# Patient Record
Sex: FEMALE | Race: WHITE | NOT HISPANIC OR LATINO | URBAN - METROPOLITAN AREA
[De-identification: names, ages, dates, MRNs, and addresses within clinical notes are randomized per-mention and may not be internally consistent; named-entity substitution may affect disease eponyms.]

---

## 2017-09-09 ENCOUNTER — APPOINTMENT (OUTPATIENT)
Dept: GENERAL RADIOLOGY | Facility: HOSPITAL | Age: 67
End: 2017-09-09

## 2017-09-09 ENCOUNTER — HOSPITAL ENCOUNTER (EMERGENCY)
Facility: HOSPITAL | Age: 67
Discharge: HOME | End: 2017-09-10
Attending: EMERGENCY MEDICINE | Admitting: EMERGENCY MEDICINE

## 2017-09-09 ENCOUNTER — APPOINTMENT (OUTPATIENT)
Dept: CT IMAGING | Facility: HOSPITAL | Age: 67
End: 2017-09-09

## 2017-09-09 DIAGNOSIS — R10.33 PERIUMBILICAL ABDOMINAL PAIN: Primary | ICD-10-CM

## 2017-09-09 DIAGNOSIS — R10.31 RLQ ABDOMINAL PAIN: ICD-10-CM

## 2017-09-09 DIAGNOSIS — K59.00 CONSTIPATION, UNSPECIFIED CONSTIPATION TYPE: ICD-10-CM

## 2017-09-09 LAB
ALBUMIN SERPL-MCNC: 4.7 G/DL (ref 3.2–4.8)
ALBUMIN/GLOB SERPL: 1.3 G/DL (ref 1.5–2.5)
ALP SERPL-CCNC: 52 U/L (ref 25–100)
ALT SERPL W P-5'-P-CCNC: 17 U/L (ref 7–40)
ANION GAP SERPL CALCULATED.3IONS-SCNC: 4 MMOL/L (ref 3–11)
AST SERPL-CCNC: 24 U/L (ref 0–33)
BASOPHILS # BLD AUTO: 0.03 10*3/MM3 (ref 0–0.2)
BASOPHILS NFR BLD AUTO: 0.6 % (ref 0–1)
BILIRUB SERPL-MCNC: 0.5 MG/DL (ref 0.3–1.2)
BILIRUB UR QL STRIP: NEGATIVE
BUN BLD-MCNC: 12 MG/DL (ref 9–23)
BUN/CREAT SERPL: 17.1 (ref 7–25)
CALCIUM SPEC-SCNC: 10.9 MG/DL (ref 8.7–10.4)
CHLORIDE SERPL-SCNC: 102 MMOL/L (ref 99–109)
CLARITY UR: CLEAR
CO2 SERPL-SCNC: 33 MMOL/L (ref 20–31)
COLOR UR: YELLOW
CREAT BLD-MCNC: 0.7 MG/DL (ref 0.6–1.3)
DEPRECATED RDW RBC AUTO: 40.9 FL (ref 37–54)
EOSINOPHIL # BLD AUTO: 0.08 10*3/MM3 (ref 0–0.3)
EOSINOPHIL NFR BLD AUTO: 1.6 % (ref 0–3)
ERYTHROCYTE [DISTWIDTH] IN BLOOD BY AUTOMATED COUNT: 12.7 % (ref 11.3–14.5)
GFR SERPL CREATININE-BSD FRML MDRD: 84 ML/MIN/1.73
GLOBULIN UR ELPH-MCNC: 3.7 GM/DL
GLUCOSE BLD-MCNC: 118 MG/DL (ref 70–100)
GLUCOSE UR STRIP-MCNC: NEGATIVE MG/DL
HCT VFR BLD AUTO: 42 % (ref 34.5–44)
HGB BLD-MCNC: 14.3 G/DL (ref 11.5–15.5)
HGB UR QL STRIP.AUTO: NEGATIVE
HOLD SPECIMEN: NORMAL
HOLD SPECIMEN: NORMAL
IMM GRANULOCYTES # BLD: 0 10*3/MM3 (ref 0–0.03)
IMM GRANULOCYTES NFR BLD: 0 % (ref 0–0.6)
KETONES UR QL STRIP: NEGATIVE
LEUKOCYTE ESTERASE UR QL STRIP.AUTO: NEGATIVE
LIPASE SERPL-CCNC: 38 U/L (ref 6–51)
LYMPHOCYTES # BLD AUTO: 1.47 10*3/MM3 (ref 0.6–4.8)
LYMPHOCYTES NFR BLD AUTO: 29.3 % (ref 24–44)
MCH RBC QN AUTO: 29.7 PG (ref 27–31)
MCHC RBC AUTO-ENTMCNC: 34 G/DL (ref 32–36)
MCV RBC AUTO: 87.1 FL (ref 80–99)
MONOCYTES # BLD AUTO: 0.3 10*3/MM3 (ref 0–1)
MONOCYTES NFR BLD AUTO: 6 % (ref 0–12)
NEUTROPHILS # BLD AUTO: 3.14 10*3/MM3 (ref 1.5–8.3)
NEUTROPHILS NFR BLD AUTO: 62.5 % (ref 41–71)
NITRITE UR QL STRIP: NEGATIVE
PH UR STRIP.AUTO: 6 [PH] (ref 5–8)
PLATELET # BLD AUTO: 148 10*3/MM3 (ref 150–450)
PMV BLD AUTO: 8.1 FL (ref 6–12)
POTASSIUM BLD-SCNC: 3.8 MMOL/L (ref 3.5–5.5)
PROT SERPL-MCNC: 8.4 G/DL (ref 5.7–8.2)
PROT UR QL STRIP: NEGATIVE
RBC # BLD AUTO: 4.82 10*6/MM3 (ref 3.89–5.14)
SODIUM BLD-SCNC: 139 MMOL/L (ref 132–146)
SP GR UR STRIP: 1.01 (ref 1–1.03)
TROPONIN I SERPL-MCNC: 0.01 NG/ML (ref 0–0.07)
UROBILINOGEN UR QL STRIP: NORMAL
WBC NRBC COR # BLD: 5.02 10*3/MM3 (ref 3.5–10.8)
WHOLE BLOOD HOLD SPECIMEN: NORMAL
WHOLE BLOOD HOLD SPECIMEN: NORMAL

## 2017-09-09 PROCEDURE — 74177 CT ABD & PELVIS W/CONTRAST: CPT

## 2017-09-09 PROCEDURE — 0 IOPAMIDOL 61 % SOLUTION: Performed by: EMERGENCY MEDICINE

## 2017-09-09 PROCEDURE — 93005 ELECTROCARDIOGRAM TRACING: CPT

## 2017-09-09 PROCEDURE — 25010000002 PROMETHAZINE PER 50 MG: Performed by: PHYSICIAN ASSISTANT

## 2017-09-09 PROCEDURE — 80053 COMPREHEN METABOLIC PANEL: CPT | Performed by: EMERGENCY MEDICINE

## 2017-09-09 PROCEDURE — 96374 THER/PROPH/DIAG INJ IV PUSH: CPT

## 2017-09-09 PROCEDURE — 96361 HYDRATE IV INFUSION ADD-ON: CPT

## 2017-09-09 PROCEDURE — 81003 URINALYSIS AUTO W/O SCOPE: CPT | Performed by: EMERGENCY MEDICINE

## 2017-09-09 PROCEDURE — 71010 HC CHEST PA OR AP: CPT

## 2017-09-09 PROCEDURE — 83690 ASSAY OF LIPASE: CPT | Performed by: EMERGENCY MEDICINE

## 2017-09-09 PROCEDURE — 85025 COMPLETE CBC W/AUTO DIFF WBC: CPT | Performed by: EMERGENCY MEDICINE

## 2017-09-09 PROCEDURE — 84484 ASSAY OF TROPONIN QUANT: CPT

## 2017-09-09 PROCEDURE — 99284 EMERGENCY DEPT VISIT MOD MDM: CPT

## 2017-09-09 PROCEDURE — 0 DIATRIZOATE MEGLUMINE & SODIUM PER 1 ML: Performed by: EMERGENCY MEDICINE

## 2017-09-09 RX ORDER — MAGNESIUM CARB/ALUMINUM HYDROX 105-160MG
150 TABLET,CHEWABLE ORAL ONCE
Status: COMPLETED | OUTPATIENT
Start: 2017-09-09 | End: 2017-09-10

## 2017-09-09 RX ORDER — DOCUSATE SODIUM 100 MG/1
100 CAPSULE, LIQUID FILLED ORAL 2 TIMES DAILY
Qty: 30 CAPSULE | Refills: 0 | Status: SHIPPED | OUTPATIENT
Start: 2017-09-09

## 2017-09-09 RX ORDER — SODIUM CHLORIDE 0.9 % (FLUSH) 0.9 %
10 SYRINGE (ML) INJECTION AS NEEDED
Status: DISCONTINUED | OUTPATIENT
Start: 2017-09-09 | End: 2017-09-10 | Stop reason: HOSPADM

## 2017-09-09 RX ORDER — PROMETHAZINE HYDROCHLORIDE 25 MG/ML
12.5 INJECTION, SOLUTION INTRAMUSCULAR; INTRAVENOUS ONCE
Status: COMPLETED | OUTPATIENT
Start: 2017-09-09 | End: 2017-09-09

## 2017-09-09 RX ORDER — POLYETHYLENE GLYCOL 3350 17 G/17G
17 POWDER, FOR SOLUTION ORAL DAILY PRN
Qty: 30 EACH | Refills: 0 | Status: SHIPPED | OUTPATIENT
Start: 2017-09-09

## 2017-09-09 RX ORDER — CITALOPRAM 10 MG/1
10 TABLET ORAL DAILY
COMMUNITY

## 2017-09-09 RX ADMIN — SODIUM CHLORIDE 1000 ML: 9 INJECTION, SOLUTION INTRAVENOUS at 21:07

## 2017-09-09 RX ADMIN — DIATRIZOATE MEGLUMINE AND DIATRIZOATE SODIUM 15 ML: 660; 100 LIQUID ORAL; RECTAL at 21:11

## 2017-09-09 RX ADMIN — IOPAMIDOL 80 ML: 612 INJECTION, SOLUTION INTRAVENOUS at 22:26

## 2017-09-09 RX ADMIN — PROMETHAZINE HYDROCHLORIDE 12.5 MG: 25 INJECTION INTRAMUSCULAR; INTRAVENOUS at 21:07

## 2017-09-10 VITALS
TEMPERATURE: 98.1 F | SYSTOLIC BLOOD PRESSURE: 121 MMHG | BODY MASS INDEX: 20.66 KG/M2 | HEART RATE: 65 BPM | DIASTOLIC BLOOD PRESSURE: 73 MMHG | RESPIRATION RATE: 16 BRPM | WEIGHT: 124 LBS | HEIGHT: 65 IN | OXYGEN SATURATION: 97 %

## 2017-09-10 RX ADMIN — CITROMA MAGNESIUM CITRATE 150 ML: 1.75 LIQUID ORAL at 00:46

## 2017-09-10 NOTE — DISCHARGE INSTRUCTIONS
Patient abdominal exam revealed periumbilical and right lower quadrant abdominal pain.  We performed CT of the abdomen and pelvis with or oral and IV contrast to evaluate for appendicitis.  The appendix was visualized on CT scan and was within normal limits.  There was no sign of inflammatory change.  Patient did have constipation.  Rx for Colace and MiraLAX.  Patient is to have repeat abdominal exam in 12 hours if symptoms persist or return sooner if worsening symptoms of abdominal pain.  She also needs to follow up closely with Dr. Billings, her PCP on Monday for recheck.

## 2017-09-10 NOTE — ED PROVIDER NOTES
Subjective   HPI Comments: This is a very pleasant 67-year-old  female that presents to the ER with onset of periumbilical abdominal pain that started this afternoon at around 1:30 PM.  Patient ate a normal breakfast and lunch.  She started having periumbilical pain that was cramping and constant.  She tried to take some Tums and Pepto-Bismol without any relief.  The pain started to worsen and migrate down to her right lower quadrant.  Patient became concerned about appendicitis.  She has a strong family history of appendicitis in both her mother, sister, and multiple other relatives.  She reports chills but denies any particular fever.  She denies any urinary changes including dysuria, frequency, or urgency.  She had a normal bowel movement this morning.  She says movement worsens her abdominal pain.  She denies any previous abdominal surgery.    Patient is a 67 y.o. female presenting with abdominal pain.   History provided by:  Patient  Abdominal Pain   Pain location:  Periumbilical and RLQ  Pain quality: aching and throbbing    Pain radiation: pain radiates from periumbilical to RLQ now.  Pain severity:  Moderate  Onset quality:  Sudden  Duration:  6 hours  Timing:  Constant  Progression:  Worsening  Chronicity:  New  Context: not diet changes, not eating, not previous surgeries and not recent illness    Relieved by:  Nothing  Worsened by:  Movement (sitting, walking, riding in the car worsened pain.)  Ineffective treatments: Tums, Pepto Bismol.  Associated symptoms: anorexia and chills    Associated symptoms: no chest pain, no constipation (Normal BM earlier today.), no cough, no diarrhea, no dysuria, no fever, no hematochezia, no hematuria, no nausea, no shortness of breath and no vomiting    Risk factors: no NSAID use        Review of Systems   Constitutional: Positive for appetite change (anorexia) and chills. Negative for fever.   HENT: Negative.    Respiratory: Negative for cough, chest tightness,  shortness of breath and wheezing.    Cardiovascular: Negative for chest pain and palpitations.   Gastrointestinal: Positive for abdominal pain and anorexia. Negative for constipation (Normal BM earlier today.), diarrhea, hematochezia, nausea and vomiting.   Genitourinary: Negative for decreased urine volume, difficulty urinating, dysuria, hematuria and urgency.   Musculoskeletal: Negative.    Neurological: Negative for dizziness, syncope, weakness and light-headedness.   Psychiatric/Behavioral: Negative.        Past Medical History:   Diagnosis Date   • Depression        Allergies   Allergen Reactions   • Sulfa Antibiotics Dizziness       Past Surgical History:   Procedure Laterality Date   • CATARACT EXTRACTION         History reviewed. No pertinent family history.    Social History     Social History   • Marital status:      Spouse name: N/A   • Number of children: N/A   • Years of education: N/A     Social History Main Topics   • Smoking status: Never Smoker   • Smokeless tobacco: None   • Alcohol use Yes      Comment: occasional   • Drug use: No   • Sexual activity: Not Asked     Other Topics Concern   • None     Social History Narrative   • None           Objective   Physical Exam   Constitutional: She is oriented to person, place, and time. She appears well-developed and well-nourished. No distress.   HENT:   Head: Normocephalic and atraumatic.   Mouth/Throat: Oropharynx is clear and moist.   Eyes: Conjunctivae and EOM are normal. Pupils are equal, round, and reactive to light. No scleral icterus.   Neck: Normal range of motion. Neck supple.   Cardiovascular: Normal rate, regular rhythm and normal heart sounds.    Pulmonary/Chest: Effort normal and breath sounds normal. No respiratory distress.   Abdominal: Soft. Bowel sounds are normal. She exhibits no distension. There is no hepatosplenomegaly. There is tenderness in the right lower quadrant and periumbilical area. There is guarding and tenderness at  McBurney's point. There is no rigidity, no rebound and no CVA tenderness. No hernia.       Negative flank or CVA TTP.  Negative Rovsings sign.  Negative heel tap.  Positive Obturator sign.   Musculoskeletal: Normal range of motion. She exhibits no edema.   Lymphadenopathy:     She has no cervical adenopathy.   Neurological: She is alert and oriented to person, place, and time.   Skin: Skin is warm and dry. She is not diaphoretic.   Psychiatric: She has a normal mood and affect. Her behavior is normal.   Nursing note and vitals reviewed.      Procedures         ED Course  ED Course   Comment By Time   CT of the abdomen and pelvis with oral and IV contrast revealed no acute evidence of appendicitis.  Appendix was visualized and there were no surrounding inflammatory changes.  There was evidence of constipation.  We'll discuss these findings with patient and prepare her for discharge. Jolanta Elliott PA-C 09/09 1249   Discussed results with patient.  She is feeling much better after IVF.  Re-examined abdomen and mild TTP RLQ.  No guarding.  Pt says she feels like she is having normal BMs.  I prescribed stool softeners and Miralax.  I also will give her a bottle of Mag Citrate to go.  She can take 1/2 bottle once home.  If no improvements, she can take the other 1/2 bottle in 12 hours.  She verbalized understanding.  She is to return for abdominal re-check in 12 hours if pain persists. Jolanta Elliott PA-C 09/09 3702          Recent Results (from the past 24 hour(s))   Comprehensive Metabolic Panel    Collection Time: 09/09/17  7:58 PM   Result Value Ref Range    Glucose 118 (H) 70 - 100 mg/dL    BUN 12 9 - 23 mg/dL    Creatinine 0.70 0.60 - 1.30 mg/dL    Sodium 139 132 - 146 mmol/L    Potassium 3.8 3.5 - 5.5 mmol/L    Chloride 102 99 - 109 mmol/L    CO2 33.0 (H) 20.0 - 31.0 mmol/L    Calcium 10.9 (H) 8.7 - 10.4 mg/dL    Total Protein 8.4 (H) 5.7 - 8.2 g/dL    Albumin 4.70 3.20 - 4.80 g/dL    ALT (SGPT) 17 7 - 40 U/L     AST (SGOT) 24 0 - 33 U/L    Alkaline Phosphatase 52 25 - 100 U/L    Total Bilirubin 0.5 0.3 - 1.2 mg/dL    eGFR Non African Amer 84 >60 mL/min/1.73    Globulin 3.7 gm/dL    A/G Ratio 1.3 (L) 1.5 - 2.5 g/dL    BUN/Creatinine Ratio 17.1 7.0 - 25.0    Anion Gap 4.0 3.0 - 11.0 mmol/L   Lipase    Collection Time: 09/09/17  7:58 PM   Result Value Ref Range    Lipase 38 6 - 51 U/L   Urinalysis With / Culture If Indicated    Collection Time: 09/09/17  7:58 PM   Result Value Ref Range    Color, UA Yellow Yellow, Straw    Appearance, UA Clear Clear    pH, UA 6.0 5.0 - 8.0    Specific Gravity, UA 1.014 1.001 - 1.030    Glucose, UA Negative Negative    Ketones, UA Negative Negative    Bilirubin, UA Negative Negative    Blood, UA Negative Negative    Protein, UA Negative Negative    Leuk Esterase, UA Negative Negative    Nitrite, UA Negative Negative    Urobilinogen, UA 0.2 E.U./dL 0.2 - 1.0 E.U./dL   Light Blue Top    Collection Time: 09/09/17  7:58 PM   Result Value Ref Range    Extra Tube hold for add-on    Green Top (Gel)    Collection Time: 09/09/17  7:58 PM   Result Value Ref Range    Extra Tube Hold for add-ons.    Lavender Top    Collection Time: 09/09/17  7:58 PM   Result Value Ref Range    Extra Tube hold for add-on    Gold Top - SST    Collection Time: 09/09/17  7:58 PM   Result Value Ref Range    Extra Tube Hold for add-ons.    CBC Auto Differential    Collection Time: 09/09/17  7:58 PM   Result Value Ref Range    WBC 5.02 3.50 - 10.80 10*3/mm3    RBC 4.82 3.89 - 5.14 10*6/mm3    Hemoglobin 14.3 11.5 - 15.5 g/dL    Hematocrit 42.0 34.5 - 44.0 %    MCV 87.1 80.0 - 99.0 fL    MCH 29.7 27.0 - 31.0 pg    MCHC 34.0 32.0 - 36.0 g/dL    RDW 12.7 11.3 - 14.5 %    RDW-SD 40.9 37.0 - 54.0 fl    MPV 8.1 6.0 - 12.0 fL    Platelets 148 (L) 150 - 450 10*3/mm3    Neutrophil % 62.5 41.0 - 71.0 %    Lymphocyte % 29.3 24.0 - 44.0 %    Monocyte % 6.0 0.0 - 12.0 %    Eosinophil % 1.6 0.0 - 3.0 %    Basophil % 0.6 0.0 - 1.0 %    Immature  "Grans % 0.0 0.0 - 0.6 %    Neutrophils, Absolute 3.14 1.50 - 8.30 10*3/mm3    Lymphocytes, Absolute 1.47 0.60 - 4.80 10*3/mm3    Monocytes, Absolute 0.30 0.00 - 1.00 10*3/mm3    Eosinophils, Absolute 0.08 0.00 - 0.30 10*3/mm3    Basophils, Absolute 0.03 0.00 - 0.20 10*3/mm3    Immature Grans, Absolute 0.00 0.00 - 0.03 10*3/mm3   POC Troponin, Rapid    Collection Time: 09/09/17  8:01 PM   Result Value Ref Range    Troponin I 0.01 0.00 - 0.07 ng/mL     Note: In addition to lab results from this visit, the labs listed above may include labs taken at another facility or during a different encounter within the last 24 hours. Please correlate lab times with ED admission and discharge times for further clarification of the services performed during this visit.    CT Abdomen Pelvis With Contrast   Final Result   Abnormal     Numerous non-emergent findings as described without any acute abdominopelvic    abnormality.         Colon is distended with fecal matter suggestive of constipation.       NOTE: NORMAL APPENDIX without CT evidence of acute appendicitis.         THIS DOCUMENT HAS BEEN ELECTRONICALLY SIGNED BY ANTON DIAZ MD      XR Chest 1 View   Final Result   Abnormal     No active lung parenchymal lesion.          THIS DOCUMENT HAS BEEN ELECTRONICALLY SIGNED BY ANTON DIAZ MD        Vitals:    09/09/17 1918 09/09/17 2000 09/09/17 2002 09/09/17 2204   BP: (!) 184/74 173/88  143/84   BP Location: Right arm      Patient Position: Sitting      Pulse: 63  66 71   Resp: 20      Temp: 98 °F (36.7 °C)      TempSrc: Oral      SpO2: 99%  99% 96%   Weight: 124 lb (56.2 kg)      Height: 65\" (165.1 cm)        Medications   sodium chloride 0.9 % flush 10 mL (not administered)   magnesium citrate solution 150 mL (not administered)   sodium chloride 0.9 % bolus 1,000 mL (0 mL Intravenous Stopped 9/9/17 2202)   promethazine (PHENERGAN) injection 12.5 mg (12.5 mg Intravenous Given 9/9/17 2107)   diatrizoate meglumine-sodium " (GASTROGRAFIN) 66-10 % solution 15 mL (15 mL Oral Given 9/9/17 2111)   iopamidol (ISOVUE-300) 61 % injection 100 mL (80 mL Intravenous Given 9/9/17 2226)     ECG/EMG Results (last 24 hours)     Procedure Component Value Units Date/Time    ECG 12 Lead [[id number]] Collected:  09/09/17 1948     Updated:  09/1951                [facility]    Final diagnoses:   Periumbilical abdominal pain   RLQ abdominal pain   Constipation, unspecified constipation type            Jolanta Elliott PA-C  09/09/17 1488

## 2023-03-10 ENCOUNTER — LAB (OUTPATIENT)
Dept: LAB | Facility: HOSPITAL | Age: 73
End: 2023-03-10

## 2023-03-10 ENCOUNTER — CLINICAL SUPPORT (OUTPATIENT)
Dept: GENETICS | Facility: HOSPITAL | Age: 73
End: 2023-03-10

## 2023-03-10 DIAGNOSIS — Z13.79 GENETIC TESTING: Primary | ICD-10-CM

## 2023-03-10 DIAGNOSIS — Z80.41 FAMILY HISTORY OF MALIGNANT NEOPLASM OF OVARY: ICD-10-CM

## 2023-03-10 PROCEDURE — 96040: CPT | Performed by: GENETIC COUNSELOR, MS

## 2023-03-10 NOTE — PROGRESS NOTES
Tanja Nunes, a 72-year-old female, was referred for genetic counseling due to a family history of ovarian cancer. She was 14 years old at menarche and had her first child at 36. Ms. Nunes’s current cancer screening include annual clinical breast exam, annual mammogram, and colonoscopy every five years. She reports participating in ovarian cancer screening at . Ms. Nunes reports having a few polyps removed on colonoscopy. She retains her uterus and ovaries. She was interested in discussing her risk for a hereditary cancer syndrome.  Ms. Nunes was interested in pursuing a multigene panel, and therefore the CancerNext panel was ordered through [facility] which analyzes BRCA1/2 and 34 additional genes associated with an increased cancer risk. Results are expected in 2-3 weeks.     FAMILY HISTORY (see attached pedigree):   Mother:   Ovarian cancer, 84  Mat. Grandmother:  Ovarian cancer, 72    We do not have medical records confirming the diagnoses in Ms. Nunes’s family.    RISK ASSESSMENT:  Ms. Nunes’s family history of ovarian cancer led to concern regarding a hereditary cancer syndrome.  She meets NCCN guidelines criteria for BRCA1/2 testing based on her family history of ovarian cancer in two close relatives. If genetic testing is negative, Ms. Nunes’s management should be guided by family history.     GENETIC COUNSELING (30 minutes):  We reviewed the family history information in detail.  Cases of cancer follow three general patterns: sporadic, familial, and hereditary.  While most cancer is sporadic, some cases appear to occur in family clusters.  These cases are said to be familial and account for 10-20% of certain cancer cases.  Familial cases may be due to a combination of shared genes and environmental factors among family members.  In even fewer families, the cancer is said to be inherited, and the genes responsible for the cancer are known.       Family histories typical of hereditary cancer syndromes usually include multiple first- and second-degree relatives diagnosed with cancer types that define a syndrome.  These cases tend to be diagnosed at younger-than-expected ages and can be bilateral or multifocal.  The cancer in these families follows an autosomal dominant inheritance pattern, which indicates the likely presence of a mutation in a cancer susceptibility gene.  Children and siblings of an individual believed to carry this mutation have a 50% chance of inheriting that mutation, thereby inheriting the increased risk to develop cancer.  These mutations can be passed down from the maternal or the paternal lineage.    Based on Ms. Nunes’s family history, we discussed that hereditary breast and ovarian cancer accounts for approximately 10% of all cases.  A significant proportion of hereditary breast and ovarian cancer can be attributed to mutations in the BRCA1 and BRCA2 genes.  Mutations in these genes confer an increased risk for breast cancer, ovarian cancer, male breast cancer, prostate cancer, and pancreatic cancer. Women with a BRCA1 or BRCA2 mutation have up to an 87% lifetime risk of breast cancer and up to a 44% risk of ovarian cancer. These genes are not responsible for every case of hereditary breast and ovarian cancer, and we discussed multigene panels that can evaluate BRCA1/2 and a number of additional cancer related genes simultaneously. The standard approach to genetic testing is via a multigene panel.  Genes included on these panels have varying degrees of risk associated, and management and screening guidelines vary based on the specific gene.  Hereditary cancer syndromes can demonstrate incomplete penetrance and variable expression within families. There are genes that are evaluated that have been more recently described, and there may be less data regarding the risks and therefore may not have established management  guidelines at this time. Based on Ms. Nunes’s family history and her desire to get more information regarding her personal risks she opted to pursue testing through a panel evaluating several other genes known to increase the risk for cancer.    GENETIC TESTING:  The risks, benefits and limitations of genetic testing and implications for clinical management following testing were reviewed. DNA test results can influence decisions regarding screening and prevention.  Genetic testing can have significant psychological implications for both individuals and families. Also discussed was the possibility of employment and insurance discrimination based on genetic test results and the federal and states laws that are in place to prevent this ([place]).         We discussed multigene panel testing, which would involve testing BRCA1/2 and an additional 34 genes associated with an increased cancer risk. The benefits and limitations of genetic testing were discussed and Ms. Nunes decided to pursue testing of the genes via the panel. The implications of a positive or negative test result were discussed.  We also discussed the importance of testing on an affected relative.  In cases where an affected relative is not available for testing or not willing to pursue testing, it is appropriate to offer testing to an unaffected individual. We discussed the possibility that, in some cases, genetic test results may be ambiguous due to the identification of a genetic variant. These variants may or may not be associated with an increased cancer risk. With multigene panel testing, it is not uncommon for a variant of uncertain significance (VUS) to be identified.  If a VUS is identified, testing family members is not recommended and screening recommendations are made based on the family history.  The laboratories that perform genetic testing work to reclassify the VUS and send out an amended report if and when a VUS is  reclassified.  The majority of variant findings are ultimately reclassified to a negative result. Given her family history, a negative test result does not eliminate all cancer risk, although the risk would not be as high as it would with positive genetic testing.     PLAN:  Genetic testing via the CancerNext panel through [facility] was ordered. Results are expected in 2-3 weeks and we will contact Ms. Manju with her results once they are received.      Minna Swartz MS, [facility], [facility]  Licensed Certified Genetic Counselor

## 2023-03-23 ENCOUNTER — DOCUMENTATION (OUTPATIENT)
Dept: GENETICS | Facility: HOSPITAL | Age: 73
End: 2023-03-23
Payer: MEDICARE

## 2023-03-23 ENCOUNTER — TELEPHONE (OUTPATIENT)
Dept: GENETICS | Facility: HOSPITAL | Age: 73
End: 2023-03-23
Payer: MEDICARE

## 2023-03-23 NOTE — TELEPHONE ENCOUNTER
Spoke with patient and disclosed negative genetic results. Informed patient these results would be on [facility] [employer] and sent to her Dr. Patient requested a copy be mailed to her.

## 2023-03-23 NOTE — PROGRESS NOTES
Tanja Nunes, a 72-year-old female, was referred for genetic counseling due to a family history of ovarian cancer. She was 14 years old at menarche and had her first child at 36. Ms. Nunes’s current cancer screening include annual clinical breast exam, annual mammogram, and colonoscopy every five years. She reports participating in ovarian cancer screening at . Ms. Nunes reports having a few polyps removed on colonoscopy. She retains her uterus and ovaries. She was interested in discussing her risk for a hereditary cancer syndrome.  Ms. Nunes was interested in pursuing a multigene panel, and therefore the CancerNext panel was ordered through [employer] which analyzes BRCA1/2 and 34 additional genes associated with an increased cancer risk. The genes on this panel include APC, ALMAZ, AXIN2, BARD1, BMPR1A, BRCA1, BRCA2, BRIP1, CDH1, CDK4, CDKN2A, CHEK2, DICER1, EPCAM, GREM1, HOXB13, MLH1, MSH2, MSH3, MSH6, MUTYH, NBN, NF1, NTHL1, PALB2, PMS2, POLD1, POLE, PTEN, RAD51C, RAD51D, RECQL, SMAD4, SMARCA4, STK11, and TP53.  Genetic testing was negative for pathogenic mutations in BRCA1/2 and 34 additional genes included on the CancerNext panel. These normal results were discussed with Ms. Nunes by telephone on 3/23/2023.    FAMILY HISTORY (see attached pedigree):   Mother:   Ovarian cancer, 84  Mat. Grandmother:  Ovarian cancer, 72    We do not have medical records confirming the diagnoses in Ms. Nunes’s family.    RISK ASSESSMENT:  Ms. Nunes’s family history of ovarian cancer led to concern regarding a hereditary cancer syndrome.  She meets NCCN guidelines criteria for BRCA1/2 testing based on her family history of ovarian cancer in two close relatives. If genetic testing is negative, Ms. Nunes’s management should be guided by family history.     At this time, Ms. Nunes’s management should be guided by a family history-based risk assessment.  Karolyn-Annikazick, version 8 is able to take into account personal factors and family history when calculating risk for breast cancer.  Computer modeling estimates that Ms. Nunes’s lifetime personal risk for developing breast cancer is 3.7% (Yaritza, v8), compared to the average 71-year-old female’s lifetime risk of 4.1%. In general, a lifetime risk above 20% is considered to be “high risk” where increased screening is warranted; Ms. Nunes’s risk does not fall into that category. This risk assessment is based on the family history information provided at the time of the appointment and could change in the future should new information be obtained.    GENETIC COUNSELING:  We reviewed the family history information in detail.  Cases of cancer follow three general patterns: sporadic, familial, and hereditary.  While most cancer is sporadic, some cases appear to occur in family clusters.  These cases are said to be familial and account for 10-20% of certain cancer cases.  Familial cases may be due to a combination of shared genes and environmental factors among family members.  In even fewer families, the cancer is said to be inherited, and the genes responsible for the cancer are known.      Family histories typical of hereditary cancer syndromes usually include multiple first- and second-degree relatives diagnosed with cancer types that define a syndrome.  These cases tend to be diagnosed at younger-than-expected ages and can be bilateral or multifocal.  The cancer in these families follows an autosomal dominant inheritance pattern, which indicates the likely presence of a mutation in a cancer susceptibility gene.  Children and siblings of an individual believed to carry this mutation have a 50% chance of inheriting that mutation, thereby inheriting the increased risk to develop cancer.  These mutations can be passed down from the maternal or the paternal lineage.    Based on Ms. Nunes’s family  history, we discussed that hereditary breast and ovarian cancer accounts for approximately 10% of all cases.  A significant proportion of hereditary breast and ovarian cancer can be attributed to mutations in the BRCA1 and BRCA2 genes.  Mutations in these genes confer an increased risk for breast cancer, ovarian cancer, male breast cancer, prostate cancer, and pancreatic cancer. Women with a BRCA1 or BRCA2 mutation have up to an 87% lifetime risk of breast cancer and up to a 44% risk of ovarian cancer. These genes are not responsible for every case of hereditary breast and ovarian cancer, and we discussed multigene panels that can evaluate BRCA1/2 and a number of additional cancer related genes simultaneously. The standard approach to genetic testing is via a multigene panel.  Genes included on these panels have varying degrees of risk associated, and management and screening guidelines vary based on the specific gene.  Hereditary cancer syndromes can demonstrate incomplete penetrance and variable expression within families. There are genes that are evaluated that have been more recently described, and there may be less data regarding the risks and therefore may not have established management guidelines at this time. Based on Ms. Nunes’s family history and her desire to get more information regarding her personal risks she opted to pursue testing through a panel evaluating several other genes known to increase the risk for cancer.    GENETIC TESTING:  The risks, benefits and limitations of genetic testing and implications for clinical management following testing were reviewed. DNA test results can influence decisions regarding screening and prevention.  Genetic testing can have significant psychological implications for both individuals and families. Also discussed was the possibility of employment and insurance discrimination based on genetic test results and the federal and states laws that are in place  to prevent this (DEMARC[place]).         We discussed multigene panel testing, which would involve testing BRCA1/2 and an additional 34 genes associated with an increased cancer risk. The benefits and limitations of genetic testing were discussed and Ms. Nunes decided to pursue testing of the genes via the panel. The implications of a positive or negative test result were discussed.  We also discussed the importance of testing on an affected relative.  In cases where an affected relative is not available for testing or not willing to pursue testing, it is appropriate to offer testing to an unaffected individual. We discussed the possibility that, in some cases, genetic test results may be ambiguous due to the identification of a genetic variant. These variants may or may not be associated with an increased cancer risk. With multigene panel testing, it is not uncommon for a variant of uncertain significance (VUS) to be identified.  If a VUS is identified, testing family members is not recommended and screening recommendations are made based on the family history.  The laboratories that perform genetic testing work to reclassify the VUS and send out an amended report if and when a VUS is reclassified.  The majority of variant findings are ultimately reclassified to a negative result. Given her family history, a negative test result does not eliminate all cancer risk, although the risk would not be as high as it would with positive genetic testing.     TEST RESULTS:  Genetic testing was negative for pathogenic mutations by sequencing, rearrangement testing, and RNA analysis for the 36 genes on the CancerNext panel.  The negative result greatly lowers the risk of a hereditary cancer syndrome for Ms. Nunes. It is possible that the family history is due to a hereditary cancer syndrome which she did not happen to inherit.  This assessment is based on the information provided at the time of the  consultation.    CANCER PREVENTION:  Options available to individuals with an elevated lifetime risk for breast and/or ovarian cancer were briefly discussed.  Based on computer modeling, Ms. Nunes’s lifetime risk for breast cancer would not be considered “high risk” (>20%).   Per NCCN guidelines, it is appropriate for her to follow general population screening guidelines for her breast cancer risk including annual clinical breast exam and annual mammography. Her lifetime risk for ovarian cancer could be elevated to 4-6%, above the average risk of 1-2%, based on having a first-degree relative with an ovarian cancer. However, no specific guideline recommendations have been made based on this degree of risk. This assessment is based on the information provided at the time of consultation and could change should new information be obtained.     PLAN:  Genetic counseling remains available to Ms. Nunes. If she has any questions or concerns, she is welcome to contact us at [phone].      Minna Swartz MS, [facility] [place] [place]  Licensed Certified Genetic Counselor        Cc: Tanja Hoffman MD